# Patient Record
Sex: FEMALE | Race: WHITE | NOT HISPANIC OR LATINO
[De-identification: names, ages, dates, MRNs, and addresses within clinical notes are randomized per-mention and may not be internally consistent; named-entity substitution may affect disease eponyms.]

---

## 2018-05-01 PROBLEM — Z00.00 ENCOUNTER FOR PREVENTIVE HEALTH EXAMINATION: Status: ACTIVE | Noted: 2018-05-01

## 2018-05-07 ENCOUNTER — APPOINTMENT (OUTPATIENT)
Dept: OBGYN | Facility: CLINIC | Age: 58
End: 2018-05-07

## 2019-12-12 ENCOUNTER — APPOINTMENT (OUTPATIENT)
Dept: OBGYN | Facility: CLINIC | Age: 59
End: 2019-12-12
Payer: MEDICAID

## 2019-12-12 PROCEDURE — 99396 PREV VISIT EST AGE 40-64: CPT

## 2020-01-09 ENCOUNTER — APPOINTMENT (OUTPATIENT)
Dept: OBGYN | Facility: CLINIC | Age: 60
End: 2020-01-09

## 2021-05-10 ENCOUNTER — APPOINTMENT (OUTPATIENT)
Dept: OBGYN | Facility: CLINIC | Age: 61
End: 2021-05-10
Payer: MEDICAID

## 2021-05-10 PROCEDURE — 99072 ADDL SUPL MATRL&STAF TM PHE: CPT

## 2021-05-10 PROCEDURE — 99213 OFFICE O/P EST LOW 20 MIN: CPT | Mod: 25

## 2021-05-10 PROCEDURE — 99396 PREV VISIT EST AGE 40-64: CPT | Mod: 25

## 2021-05-24 ENCOUNTER — APPOINTMENT (OUTPATIENT)
Dept: OBGYN | Facility: CLINIC | Age: 61
End: 2021-05-24

## 2021-06-02 ENCOUNTER — TRANSCRIPTION ENCOUNTER (OUTPATIENT)
Age: 61
End: 2021-06-02

## 2021-08-16 ENCOUNTER — OUTPATIENT (OUTPATIENT)
Dept: OUTPATIENT SERVICES | Facility: HOSPITAL | Age: 61
LOS: 1 days | Discharge: HOME | End: 2021-08-16
Payer: MEDICAID

## 2021-08-16 DIAGNOSIS — R92.8 OTHER ABNORMAL AND INCONCLUSIVE FINDINGS ON DIAGNOSTIC IMAGING OF BREAST: ICD-10-CM

## 2021-08-16 PROCEDURE — G0279: CPT | Mod: 26

## 2021-08-16 PROCEDURE — 77061 BREAST TOMOSYNTHESIS UNI: CPT | Mod: 26

## 2021-08-16 PROCEDURE — 77062 BREAST TOMOSYNTHESIS BI: CPT | Mod: 26

## 2021-08-16 PROCEDURE — 76642 ULTRASOUND BREAST LIMITED: CPT | Mod: 26,RT

## 2021-08-16 PROCEDURE — 77066 DX MAMMO INCL CAD BI: CPT | Mod: 26

## 2022-03-28 ENCOUNTER — RESULT REVIEW (OUTPATIENT)
Age: 62
End: 2022-03-28

## 2022-03-28 ENCOUNTER — OUTPATIENT (OUTPATIENT)
Dept: OUTPATIENT SERVICES | Facility: HOSPITAL | Age: 62
LOS: 1 days | Discharge: HOME | End: 2022-03-28
Payer: MEDICAID

## 2022-03-28 DIAGNOSIS — R92.8 OTHER ABNORMAL AND INCONCLUSIVE FINDINGS ON DIAGNOSTIC IMAGING OF BREAST: ICD-10-CM

## 2022-03-28 PROCEDURE — 77061 BREAST TOMOSYNTHESIS UNI: CPT | Mod: 26

## 2022-03-28 PROCEDURE — 76642 ULTRASOUND BREAST LIMITED: CPT | Mod: 26,RT

## 2022-03-28 PROCEDURE — 77065 DX MAMMO INCL CAD UNI: CPT | Mod: 26,RT

## 2022-09-19 ENCOUNTER — APPOINTMENT (OUTPATIENT)
Dept: ORTHOPEDIC SURGERY | Facility: CLINIC | Age: 62
End: 2022-09-19

## 2022-10-24 ENCOUNTER — APPOINTMENT (OUTPATIENT)
Dept: OBGYN | Facility: CLINIC | Age: 62
End: 2022-10-24

## 2023-07-17 ENCOUNTER — APPOINTMENT (OUTPATIENT)
Dept: OBGYN | Facility: CLINIC | Age: 63
End: 2023-07-17
Payer: MEDICAID

## 2023-07-17 DIAGNOSIS — N89.8 OTHER SPECIFIED NONINFLAMMATORY DISORDERS OF VAGINA: ICD-10-CM

## 2023-07-17 DIAGNOSIS — R10.2 PELVIC AND PERINEAL PAIN: ICD-10-CM

## 2023-07-17 DIAGNOSIS — Z01.419 ENCOUNTER FOR GYNECOLOGICAL EXAMINATION (GENERAL) (ROUTINE) W/OUT ABNORMAL FINDINGS: ICD-10-CM

## 2023-07-17 PROCEDURE — 99213 OFFICE O/P EST LOW 20 MIN: CPT | Mod: 25

## 2023-07-17 PROCEDURE — 99396 PREV VISIT EST AGE 40-64: CPT

## 2023-07-17 NOTE — HISTORY OF PRESENT ILLNESS
[FreeTextEntry1] : --61 Y/O HERE FOR CHECK UP.PT C/O VAGINAL BURNING;-[facility]\par HX OF  FIBROID UTERUS.\par PMHX; htn\par SOCIAL;-ETOH   -CIGG       \par STD;    /          DISCUSSED CONDOMS\par FAMILY HX OF BREAST CANCER,OVARIAN,UTERINE CA:\par REVIEW OF SYMPTOMS DONE\par ALLERGIES; Patient has answered NKDA\par Medication reconciliation was completed by reviewing, with the patient's\par involvement, the patient's current outpatient medications and those \par ordered for the patient today. x\par \par PE; BREASTS;- MASSES DC NODES SBE\par ABDOMEN SOFT NT ND\par NL GENITALIA\par VAGINA -DC MIN WHITE\par CX-CMT\par UTERUS 8 WEEKS SIZE MILD TENDER\par ADNEXA NT - MASSES\par \par A;P;CHECK UP, FIBROID UTERUS,VAGINAL DC\par -PAP\par -BD AFFIRM\par -EDWARD\par -SONO\par -F-U AFTER ABOVE.

## 2023-07-19 LAB
CYTOLOGY CVX/VAG DOC THIN PREP: ABNORMAL
HPV HIGH+LOW RISK DNA PNL CVX: NOT DETECTED

## 2023-07-24 ENCOUNTER — APPOINTMENT (OUTPATIENT)
Dept: OBGYN | Facility: CLINIC | Age: 63
End: 2023-07-24

## 2023-07-27 ENCOUNTER — APPOINTMENT (OUTPATIENT)
Dept: OBGYN | Facility: CLINIC | Age: 63
End: 2023-07-27
Payer: MEDICAID

## 2023-07-27 PROCEDURE — 76830 TRANSVAGINAL US NON-OB: CPT

## 2023-07-27 PROCEDURE — 99213 OFFICE O/P EST LOW 20 MIN: CPT

## 2023-07-27 NOTE — HISTORY OF PRESENT ILLNESS
[FreeTextEntry1] : --63 Y/O HERE FOR F-U;PT C/O FIBROID UTERUS\par SONO REVIEWED;THICKENED LINING\par HX OF  FIBROID UTERUS.\par PMHX; htn\par SOCIAL;-ETOH   -CIGG       \par STD;    /          DISCUSSED CONDOMS\par FAMILY HX OF BREAST CANCER,OVARIAN,UTERINE CA:\par REVIEW OF SYMPTOMS DONE\par ALLERGIES; Patient has answered NKDA\par Medication reconciliation was completed by reviewing, with the patient's\par involvement, the patient's current outpatient medications and those \par ordered for the patient today. x\par \par PE; \par ABDOMEN SOFT NT ND\par EXT -CCE\par \par A;P;FIBROID UTERUS,THICKENED LINING\par HYSTEROSCOPY\par -ANSWERED QUESTIONS.

## 2023-07-31 ENCOUNTER — RESULT REVIEW (OUTPATIENT)
Age: 63
End: 2023-07-31

## 2023-07-31 ENCOUNTER — OUTPATIENT (OUTPATIENT)
Dept: OUTPATIENT SERVICES | Facility: HOSPITAL | Age: 63
LOS: 1 days | End: 2023-07-31
Payer: MEDICAID

## 2023-07-31 DIAGNOSIS — R92.8 OTHER ABNORMAL AND INCONCLUSIVE FINDINGS ON DIAGNOSTIC IMAGING OF BREAST: ICD-10-CM

## 2023-07-31 PROCEDURE — 77066 DX MAMMO INCL CAD BI: CPT

## 2023-07-31 PROCEDURE — 76642 ULTRASOUND BREAST LIMITED: CPT | Mod: RT

## 2023-07-31 PROCEDURE — 77066 DX MAMMO INCL CAD BI: CPT | Mod: 26

## 2023-07-31 PROCEDURE — 76642 ULTRASOUND BREAST LIMITED: CPT | Mod: 26,RT

## 2023-07-31 PROCEDURE — G0279: CPT

## 2023-07-31 PROCEDURE — 77062 BREAST TOMOSYNTHESIS BI: CPT | Mod: 26

## 2023-08-01 DIAGNOSIS — R92.8 OTHER ABNORMAL AND INCONCLUSIVE FINDINGS ON DIAGNOSTIC IMAGING OF BREAST: ICD-10-CM

## 2023-08-21 ENCOUNTER — APPOINTMENT (OUTPATIENT)
Dept: OBGYN | Facility: CLINIC | Age: 63
End: 2023-08-21
Payer: MEDICAID

## 2023-08-21 DIAGNOSIS — D21.9 BENIGN NEOPLASM OF CONNECTIVE AND OTHER SOFT TISSUE, UNSPECIFIED: ICD-10-CM

## 2023-08-21 PROCEDURE — 99213 OFFICE O/P EST LOW 20 MIN: CPT | Mod: 25

## 2023-08-21 PROCEDURE — 58558Z: CUSTOM

## 2023-08-21 NOTE — HISTORY OF PRESENT ILLNESS
[FreeTextEntry1] : --63 Y/O HERE FOR HYSTEROSCOPY;INFORMED CONSENT OBTAINED RBA DISCUSSED;PT C/O FIBROID UTERUS SONO REVIEWED;THICKENED LINING HX OF  FIBROID UTERUS. PMHX; htn SOCIAL;-ETOH   -CIGG        STD;    /          DISCUSSED CONDOMS FAMILY HX OF BREAST CANCER,OVARIAN,UTERINE CA: REVIEW OF SYMPTOMS DONE ALLERGIES; Patient has answered NKDA Medication reconciliation was completed by reviewing, with the patient's involvement, the patient's current outpatient medications and those  ordered for the patient today. x  PE;  ABDOMEN SOFT NT ND NL GENITALIA VAGINA -DC CX-CMT UTERUS 8 WEEKS SIZE NT ADNEXA NT - MASSES EXT -CCE  -TIME OUT DONE.  EBL LESS THAN 5CC -IN OFFICE HYSTEROSCOPY DONE WITHOUT INCIDENT; CX DIALTED ,UTERUS SOUNDED TO 6, -BOTH OSTIA SEEN; NL APPERING ENDOMETRIAL CAVITY;- MASSES - POLYPS -ENDO BX DONE WITHOUT INCIDENT -I/O MONITORED THROUGHOUT THE PROCEDURE -PT TOLERATED THE PROCEDURE WELL -INSTRUCTIONS REVIEWED -RTC 2 WEEKS.

## 2023-08-24 LAB — CORE LAB BIOPSY: NORMAL

## 2023-09-07 ENCOUNTER — APPOINTMENT (OUTPATIENT)
Dept: OBGYN | Facility: CLINIC | Age: 63
End: 2023-09-07

## 2023-09-07 ENCOUNTER — NON-APPOINTMENT (OUTPATIENT)
Age: 63
End: 2023-09-07

## 2024-12-03 ENCOUNTER — LABORATORY RESULT (OUTPATIENT)
Age: 64
End: 2024-12-03

## 2024-12-03 ENCOUNTER — APPOINTMENT (OUTPATIENT)
Dept: OBGYN | Facility: CLINIC | Age: 64
End: 2024-12-03
Payer: MEDICAID

## 2024-12-03 DIAGNOSIS — R39.9 UNSPECIFIED SYMPTOMS AND SIGNS INVOLVING THE GENITOURINARY SYSTEM: ICD-10-CM

## 2024-12-03 PROCEDURE — 99214 OFFICE O/P EST MOD 30 MIN: CPT

## 2024-12-03 RX ORDER — NITROFURANTOIN (MONOHYDRATE/MACROCRYSTALS) 25; 75 MG/1; MG/1
100 CAPSULE ORAL
Qty: 14 | Refills: 0 | Status: ACTIVE | COMMUNITY
Start: 2024-12-03 | End: 1900-01-01

## 2024-12-05 LAB
APPEARANCE: CLEAR
BACTERIA UR CULT: NORMAL
BILIRUBIN URINE: ABNORMAL
BLOOD URINE: NEGATIVE
BV BACTERIA RRNA VAG QL NAA+PROBE: NOT DETECTED
C GLABRATA RNA VAG QL NAA+PROBE: NOT DETECTED
C TRACH RRNA SPEC QL NAA+PROBE: NOT DETECTED
CANDIDA RRNA VAG QL PROBE: NOT DETECTED
COLOR: YELLOW
GLUCOSE QUALITATIVE U: NEGATIVE
KETONES URINE: NEGATIVE
LEUKOCYTE ESTERASE URINE: NEGATIVE
N GONORRHOEA RRNA SPEC QL NAA+PROBE: NOT DETECTED
NITRITE URINE: NEGATIVE
PH URINE: 6
PROTEIN URINE: ABNORMAL
SPECIFIC GRAVITY URINE: >=1.03
T VAGINALIS RRNA SPEC QL NAA+PROBE: NOT DETECTED
UROBILINOGEN URINE: NORMAL

## 2025-07-03 ENCOUNTER — APPOINTMENT (OUTPATIENT)
Dept: OBGYN | Facility: CLINIC | Age: 65
End: 2025-07-03
Payer: MEDICAID

## 2025-07-03 ENCOUNTER — NON-APPOINTMENT (OUTPATIENT)
Age: 65
End: 2025-07-03

## 2025-07-03 VITALS
BODY MASS INDEX: 38.58 KG/M2 | HEIGHT: 64 IN | DIASTOLIC BLOOD PRESSURE: 92 MMHG | WEIGHT: 226 LBS | SYSTOLIC BLOOD PRESSURE: 132 MMHG

## 2025-07-03 PROCEDURE — 99213 OFFICE O/P EST LOW 20 MIN: CPT | Mod: 25

## 2025-07-03 PROCEDURE — 96127 BRIEF EMOTIONAL/BEHAV ASSMT: CPT

## 2025-07-03 PROCEDURE — 99459 PELVIC EXAMINATION: CPT

## 2025-07-03 PROCEDURE — 99396 PREV VISIT EST AGE 40-64: CPT

## 2025-07-09 LAB — HPV HIGH+LOW RISK DNA PNL CVX: NOT DETECTED

## 2025-07-17 ENCOUNTER — APPOINTMENT (OUTPATIENT)
Dept: OBGYN | Facility: CLINIC | Age: 65
End: 2025-07-17
Payer: MEDICAID

## 2025-07-17 PROCEDURE — 99213 OFFICE O/P EST LOW 20 MIN: CPT | Mod: 25

## 2025-07-17 PROCEDURE — 76830 TRANSVAGINAL US NON-OB: CPT

## 2025-07-17 PROCEDURE — ZZZZZ: CPT

## 2025-07-19 ENCOUNTER — RESULT REVIEW (OUTPATIENT)
Age: 65
End: 2025-07-19

## 2025-07-19 ENCOUNTER — OUTPATIENT (OUTPATIENT)
Dept: OUTPATIENT SERVICES | Facility: HOSPITAL | Age: 65
LOS: 1 days | End: 2025-07-19
Payer: MEDICAID

## 2025-07-19 DIAGNOSIS — Z12.31 ENCOUNTER FOR SCREENING MAMMOGRAM FOR MALIGNANT NEOPLASM OF BREAST: ICD-10-CM

## 2025-07-19 PROCEDURE — 77067 SCR MAMMO BI INCL CAD: CPT | Mod: 26

## 2025-07-19 PROCEDURE — 77067 SCR MAMMO BI INCL CAD: CPT

## 2025-07-19 PROCEDURE — 77063 BREAST TOMOSYNTHESIS BI: CPT | Mod: 26

## 2025-07-19 PROCEDURE — 77063 BREAST TOMOSYNTHESIS BI: CPT

## 2025-07-20 DIAGNOSIS — Z12.31 ENCOUNTER FOR SCREENING MAMMOGRAM FOR MALIGNANT NEOPLASM OF BREAST: ICD-10-CM
